# Patient Record
(demographics unavailable — no encounter records)

---

## 2024-12-18 NOTE — PHYSICAL EXAM
[FreeTextEntry1] : General Appearance - Well groomed, Not in acute distress. Build & Nutrition - Well nourished.  Head and Neck Head - normocephalic, atraumatic with no lesions or palpable masses. Neck Global Assessment - no bruit auscultated on the right, no bruit auscultated on the left.  Peripheral Vascular Lower Extremity Palpation - Edema - Bilateral - 2+ Pitting edema - Bilateral.  Neurologic Mental Status Affect - normal. Speech - Normal. Thought content/perception - Normal. Cognitive function - Normal. Cranial Nerves I Olfactory - Normal. II Optic - Visual fields - Normal. III Oculomotor - Normal Bilaterally. IV Trochlear - Bilateral - Normal - Bilateral. V Trigeminal - Normal Bilaterally. VI Abducens - Bilateral - Normal - Bilateral. VII Facial - Normal Bilaterally. VIII Acoustic - Bilateral - Hearing normal - Bilateral. IX Glossopharyngeal / X Vagus - Normal. XI Accessory - Normal Bilaterally. XII Hypoglossal - Bilateral - Normal - Bilateral. Sensory - decrease in distal lower extremities in light touch, temperature up to mid-calves. Vibration diminished in toes. Motor Bulk and Contour - Bulk and Contour - normal. Normal tone and strength. 0/2 Absent - Right Achilles (L5-S2) and Left Achilles (L5-S2). 1/2 Decreased - Right Knee (L2-4) and Left Knee (L2-4). 2/2 Normal - Right Bicep (C5-6), Left Bicep (C5-6), Right Brachioradialis (C5-6), Left Brachioradialis (C5-6), Right Triceps (C7-8) and Left Triceps (C7-8). Plantar Reflexes (L4-S2) - Bilateral - Flexion - Bilateral. Coordination - Normal. Gait  - Note:  cautious, antalgic, medium based. Romberg: positive.

## 2024-12-18 NOTE — DISCUSSION/SUMMARY
[FreeTextEntry1] : Increase gabapentin to 100mg bid, 200mg nightly for better control of symptoms from polyneuropathy. No intervention to asymptomatic median nerve entrapment at the wrist.

## 2024-12-18 NOTE — HISTORY OF PRESENT ILLNESS
[FreeTextEntry1] : The patient is a 72 year old female who presents with a complaint of numbness. Note for "Numbness": She was seen in 6/2024.  She reported that she developed numbness in toes for about 4 months at that time, associated with tingling sensation in the same distribution, right more than left foot. The symptoms were more noticeable at night time when she was resting. She was given gabapentin, which helped the symptoms. She denied associated weakness. She endorsed occasional cramps in right thigh. As a matter of fact, the right thigh over the lateral aspect had also been numb and tingle intermittently. There was swelling in ankles occasionally, more often in left side. She endorsed rare low back pain without radiation.  She walked with cane for a month or two prior to initial visit due to severe right knee pain. She required a TKR for right side, for which she was somewhat hesitant. 8 years ago, she underwent TKR for left side. She developed PE after the surgery. She did not feel the numbness or the feet had contribution to the walking problem.  She had been treated for RA for about almost 10 years.  She had stiffness in hands without numbness or tingling. She denied neck pain.  She reported a fall in 12/2020 accidentally. She landed backward and sustained scalp laceration. She had brief LOC. She was evaluated in hospital and brain imaging was unremarkable. She took another two falls prior to last visit when she was here for NCS/EMG study.  She consulted cardiologist and he was not concerned about the swelling. She had venous duplex study, which was unremarkable. She wore pressure stocking.  Since last visit, she takes gabapentin 100mg bid with better control of neuropathic pain. Night time is still more symptomatic. No real neuropathic pain in upper extremities. Symptoms of right thigh have also improved with higher dosage of gabapentin.   Ongoing treatment for RA by Dr. Cain. She was recommended to try biologics. She is considering the suggestion.

## 2024-12-18 NOTE — PROCEDURE
[FreeTextEntry1] : 6/4/24 NCS/EMG: moderate distal axonal sensory motor polyneuropathy; right more than left asymptomatic median nerve entrapment at the wrist.